# Patient Record
Sex: MALE | Race: WHITE | HISPANIC OR LATINO | Employment: PART TIME | URBAN - METROPOLITAN AREA
[De-identification: names, ages, dates, MRNs, and addresses within clinical notes are randomized per-mention and may not be internally consistent; named-entity substitution may affect disease eponyms.]

---
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
## 2024-04-24 ENCOUNTER — TELEPHONE (OUTPATIENT)
Dept: FAMILY MEDICINE CLINIC | Facility: CLINIC | Age: 19
End: 2024-04-24

## 2024-04-24 NOTE — TELEPHONE ENCOUNTER
Patient due for Annual Physical. Reminder call made today 04/24/24 and did not answer. No voice mail set up. We will attempt again at a later time.

## 2024-04-29 NOTE — TELEPHONE ENCOUNTER
Attempted made to contact patient and didn't answer. Message left on voice mail to call us back here at the clinic.